# Patient Record
Sex: MALE | Race: WHITE | Employment: STUDENT | URBAN - METROPOLITAN AREA
[De-identification: names, ages, dates, MRNs, and addresses within clinical notes are randomized per-mention and may not be internally consistent; named-entity substitution may affect disease eponyms.]

---
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
## 2025-01-06 ENCOUNTER — OFFICE VISIT (OUTPATIENT)
Dept: FAMILY MEDICINE CLINIC | Facility: CLINIC | Age: 20
End: 2025-01-06
Payer: COMMERCIAL

## 2025-01-06 VITALS
HEIGHT: 67.5 IN | HEART RATE: 88 BPM | TEMPERATURE: 98 F | SYSTOLIC BLOOD PRESSURE: 106 MMHG | RESPIRATION RATE: 18 BRPM | BODY MASS INDEX: 21.41 KG/M2 | OXYGEN SATURATION: 98 % | WEIGHT: 138 LBS | DIASTOLIC BLOOD PRESSURE: 72 MMHG

## 2025-01-06 DIAGNOSIS — J02.9 SORE THROAT: Primary | ICD-10-CM

## 2025-01-06 DIAGNOSIS — J01.90 ACUTE RHINOSINUSITIS: ICD-10-CM

## 2025-01-06 DIAGNOSIS — R59.1 LYMPHADENOPATHY: ICD-10-CM

## 2025-01-06 LAB
CONTROL LINE PRESENT WITH A CLEAR BACKGROUND (YES/NO): YES YES/NO
KIT LOT #: NORMAL NUMERIC
STREP GRP A CUL-SCR: NEGATIVE

## 2025-01-06 PROCEDURE — 87635 SARS-COV-2 COVID-19 AMP PRB: CPT | Performed by: PHYSICIAN ASSISTANT

## 2025-01-06 RX ORDER — FLUTICASONE PROPIONATE 50 MCG
2 SPRAY, SUSPENSION (ML) NASAL DAILY
Qty: 16 G | Refills: 0 | Status: SHIPPED | OUTPATIENT
Start: 2025-01-06

## 2025-01-06 NOTE — PROGRESS NOTES
CHIEF COMPLAINT:     Chief Complaint   Patient presents with    Sinus Problem     2 days, congestion, nasal drainage, headache, sore throat  OTC antihistamines     HPI:   Patrick Tom is a 19 year old male who presents for sinus congestion, sore throat, body aches, ear pressure, headache for 2 days. Pt recently returned from [place]. Symptoms have been persisting since onset. Denies cough, chills,  fever. States he has baseline sinus pressure and congestion. OTC nasal sprays have not worked for him. Also c/o swollen lymph node to R side of neck, has had for a couple weeks. Was seen by PCP at onset but told to monitor. Pt has f/u appt with PCP in 3 days.   Has treated symptoms with antihistamine .       Current Outpatient Medications   Medication Sig Dispense Refill    amoxicillin clavulanate 875-125 MG Oral Tab Take 1 tablet by mouth 2 (two) times daily for 7 days. 14 tablet 0    fluticasone propionate 50 MCG/ACT Nasal Suspension 2 sprays by Each Nare route daily. 16 g 0    sertraline 50 MG Oral Tab Take 1.5 tablets (75 mg total) by mouth daily. 135 tablet 0    methylphenidate ER (CONCERTA) 36 MG Oral Tab CR Take 1 tablet (36 mg total) by mouth daily. 30 tablet 0    [START ON 1/27/2025] methylphenidate ER (CONCERTA) 36 MG Oral Tab CR Take 1 tablet (36 mg total) by mouth daily. 30 tablet 0    [START ON 2/27/2025] methylphenidate ER (CONCERTA) 36 MG Oral Tab CR Take 1 tablet (36 mg total) by mouth daily. 30 tablet 0    Clindamycin Phos-Benzoyl Perox 1.2-3.75 % External Gel Apply 1 Application topically every morning.      Cetirizine HCl (ZYRTEC) 10 MG Oral Chew Tab As Directed.      FLOVENT HFA 44 MCG/ACT Inhalation Aerosol       PROAIR  (90 Base) MCG/ACT Inhalation Aero Soln         Past Medical History:    ADHD (attention deficit hyperactivity disorder)    Asthma (HCC)    Heart murmur    followed by pediatrician,no cardiology    Seasonal allergies    Short stature (child)      Past Surgical History:    Procedure Laterality Date    Remove tonsils/adenoids,<13 y/o        No family history on file.   Social History     Socioeconomic History    Marital status: Single   Tobacco Use    Smoking status: Never    Smokeless tobacco: Never   Vaping Use    Vaping status: Never Used   Substance and Sexual Activity    Alcohol use: Yes     Alcohol/week: 3.0 standard drinks of alcohol     Types: 3 Standard drinks or equivalent per week    Drug use: Never         REVIEW OF SYSTEMS:   GENERAL: feels well otherwise,  ok appetite  HEENT: See HPI.    LUNGS: denies shortness of breath or wheezing, See HPI  CARDIOVASCULAR: denies chest pain or palpitations   NEURO: + sinus headaches.  No numbness or tingling in face.    EXAM:   /72   Pulse 88   Temp 97.6 °F (36.4 °C)   Resp 18   Ht 5' 7.5\" (1.715 m)   Wt 138 lb (62.6 kg)   SpO2 98%   BMI 21.29 kg/m²   GENERAL: well developed, well nourished, in no apparent distress  HEAD: atraumatic, normocephalic,  + tenderness on palpation of maxillary sinuses  EYES: conjunctiva clear; EOMI.  EARS: TM's clear gray, no bulging, + retraction left TM, + fluid bilaterally  NOSE: nostrils patent, + nasal mucous, nasal mucosa reddened and swollen  THROAT: oral mucosa pink, moist.  Posterior pharynx is erythematous. No exudates. Tonsils surgically absent  LUNGS: Breathing is non labored; clear to auscultation bilaterally. No wheezing, rales or rhonchi.  CARDIO: RRR without murmur  LYMPH:  + R sided ant cervical, submandibular and posterior cervical lymphadenopathy.   NEURO: No focal deficits     Recent Results (from the past 24 hours)   Strep A Assay W/Optic    Collection Time: 01/06/25  2:34 PM   Result Value Ref Range    Strep Grp A Screen negative Negative    Control Line Present with a clear background (yes/no) yes Yes/No    Kit Lot # [id number] Numeric    Kit Expiration Date 11/4/25 Date       ASSESSMENT AND PLAN:   ASSESSMENT:  Patrick Tom is a 19 year old male who presents  with    ASSESSMENT:   Encounter Diagnoses   Name Primary?    Sore throat Yes    Acute rhinosinusitis     Lymphadenopathy        PLAN: Rapid strep negative. Discussed possibility of mono. If concern for that PCP can order blood-work to r/o. Covid test sent out. Augmentin script printed to start if covid negative and if sinus/sore throat symptoms persisting with lymphadenopathy. Pt agreeable  Meds and instructions as listed below. Will start Rx Flonase. If chronic sinus issues unrelieved by consistent use of nasal spray and antihistamine will need to f/u with ENT. Risks, benefits, complications and side effects of meds discussed with patient.   OTC Tylenol/Motrin prn. Push fluids; warm salt water gargles. Yogurt/probiotic recommended.   To f/u with PCP in 3 days as scheduled. To ER for acutely worsening of s/s.  Understanding verbalized.    Meds & Refills for this Visit:  Requested Prescriptions     Signed Prescriptions Disp Refills    amoxicillin clavulanate 875-125 MG Oral Tab 14 tablet 0     Sig: Take 1 tablet by mouth 2 (two) times daily for 7 days.    fluticasone propionate 50 MCG/ACT Nasal Suspension 16 g 0     Si sprays by Each Nare route daily.           There are no Patient Instructions on file for this visit.

## 2025-01-07 LAB — SARS-COV-2 RNA RESP QL NAA+PROBE: NOT DETECTED

## 2025-01-08 ENCOUNTER — HOSPITAL ENCOUNTER (EMERGENCY)
Age: 20
Discharge: HOME | End: 2025-01-08
Attending: EMERGENCY MEDICINE
Payer: COMMERCIAL

## 2025-01-08 ENCOUNTER — APPOINTMENT (OUTPATIENT)
Dept: CT IMAGING | Age: 20
End: 2025-01-08
Attending: EMERGENCY MEDICINE
Payer: COMMERCIAL

## 2025-01-08 VITALS
BODY MASS INDEX: 20.46 KG/M2 | TEMPERATURE: 99 F | WEIGHT: 135 LBS | HEART RATE: 92 BPM | OXYGEN SATURATION: 100 % | SYSTOLIC BLOOD PRESSURE: 127 MMHG | DIASTOLIC BLOOD PRESSURE: 80 MMHG | RESPIRATION RATE: 17 BRPM | HEIGHT: 68 IN

## 2025-01-08 DIAGNOSIS — J01.90 ACUTE SINUSITIS, RECURRENCE NOT SPECIFIED, UNSPECIFIED LOCATION: ICD-10-CM

## 2025-01-08 DIAGNOSIS — B27.90 INFECTIOUS MONONUCLEOSIS WITHOUT COMPLICATION, INFECTIOUS MONONUCLEOSIS DUE TO UNSPECIFIED ORGANISM: Primary | ICD-10-CM

## 2025-01-08 DIAGNOSIS — R74.8 ELEVATED LIVER ENZYMES: ICD-10-CM

## 2025-01-08 LAB
ALBUMIN SERPL-MCNC: 4.5 G/DL (ref 3.2–4.8)
ALBUMIN/GLOB SERPL: 1.3 {RATIO} (ref 1–2)
ALP LIVER SERPL-CCNC: 168 U/L
ALT SERPL-CCNC: 157 U/L
ANION GAP SERPL CALC-SCNC: 6 MMOL/L (ref 0–18)
AST SERPL-CCNC: 138 U/L (ref ?–34)
BASOPHILS # BLD AUTO: 0.06 X10(3) UL (ref 0–0.2)
BASOPHILS NFR BLD AUTO: 0.5 %
BILIRUB SERPL-MCNC: 0.5 MG/DL (ref 0.3–1.2)
BUN BLD-MCNC: 10 MG/DL (ref 9–23)
CALCIUM BLD-MCNC: 10.1 MG/DL (ref 8.7–10.4)
CHLORIDE SERPL-SCNC: 103 MMOL/L (ref 98–112)
CO2 SERPL-SCNC: 29 MMOL/L (ref 21–32)
CREAT BLD-MCNC: 0.96 MG/DL
EGFRCR SERPLBLD CKD-EPI 2021: 117 ML/MIN/1.73M2 (ref 60–?)
EOSINOPHIL # BLD AUTO: 0.02 X10(3) UL (ref 0–0.7)
EOSINOPHIL NFR BLD AUTO: 0.2 %
ERYTHROCYTE [DISTWIDTH] IN BLOOD BY AUTOMATED COUNT: 12.1 %
GLOBULIN PLAS-MCNC: 3.6 G/DL (ref 2–3.5)
GLUCOSE BLD-MCNC: 103 MG/DL (ref 70–99)
HCT VFR BLD AUTO: 42.2 %
HETEROPH AB SER QL: POSITIVE
HGB BLD-MCNC: 14.6 G/DL
IMM GRANULOCYTES # BLD AUTO: 0.04 X10(3) UL (ref 0–1)
IMM GRANULOCYTES NFR BLD: 0.4 %
LYMPHOCYTES # BLD AUTO: 7.24 X10(3) UL (ref 1.5–5)
LYMPHOCYTES NFR BLD AUTO: 65.7 %
MCH RBC QN AUTO: 29.6 PG (ref 26–34)
MCHC RBC AUTO-ENTMCNC: 34.6 G/DL (ref 31–37)
MCV RBC AUTO: 85.6 FL
MONOCYTES # BLD AUTO: 0.75 X10(3) UL (ref 0.1–1)
MONOCYTES NFR BLD AUTO: 6.8 %
NEUTROPHILS # BLD AUTO: 2.91 X10 (3) UL (ref 1.5–7.7)
NEUTROPHILS # BLD AUTO: 2.91 X10(3) UL (ref 1.5–7.7)
NEUTROPHILS NFR BLD AUTO: 26.4 %
OSMOLALITY SERPL CALC.SUM OF ELEC: 285 MOSM/KG (ref 275–295)
PLATELET # BLD AUTO: 162 10(3)UL (ref 150–450)
POCT INFLUENZA A: NEGATIVE
POCT INFLUENZA B: NEGATIVE
POTASSIUM SERPL-SCNC: 4 MMOL/L (ref 3.5–5.1)
PROT SERPL-MCNC: 8.1 G/DL (ref 5.7–8.2)
RBC # BLD AUTO: 4.93 X10(6)UL
SARS-COV-2 RNA RESP QL NAA+PROBE: NOT DETECTED
SODIUM SERPL-SCNC: 138 MMOL/L (ref 136–145)
WBC # BLD AUTO: 11 X10(3) UL (ref 4–11)

## 2025-01-08 PROCEDURE — 85025 COMPLETE CBC W/AUTO DIFF WBC: CPT | Performed by: EMERGENCY MEDICINE

## 2025-01-08 PROCEDURE — 87430 STREP A AG IA: CPT | Performed by: EMERGENCY MEDICINE

## 2025-01-08 PROCEDURE — 96375 TX/PRO/DX INJ NEW DRUG ADDON: CPT

## 2025-01-08 PROCEDURE — 99284 EMERGENCY DEPT VISIT MOD MDM: CPT

## 2025-01-08 PROCEDURE — 86403 PARTICLE AGGLUT ANTBDY SCRN: CPT | Performed by: EMERGENCY MEDICINE

## 2025-01-08 PROCEDURE — 96374 THER/PROPH/DIAG INJ IV PUSH: CPT

## 2025-01-08 PROCEDURE — 80053 COMPREHEN METABOLIC PANEL: CPT | Performed by: EMERGENCY MEDICINE

## 2025-01-08 PROCEDURE — 87502 INFLUENZA DNA AMP PROBE: CPT | Performed by: EMERGENCY MEDICINE

## 2025-01-08 PROCEDURE — 70491 CT SOFT TISSUE NECK W/DYE: CPT | Performed by: EMERGENCY MEDICINE

## 2025-01-08 PROCEDURE — 96361 HYDRATE IV INFUSION ADD-ON: CPT

## 2025-01-08 RX ORDER — DEXAMETHASONE SODIUM PHOSPHATE 4 MG/ML
10 VIAL (ML) INJECTION ONCE
Status: COMPLETED | OUTPATIENT
Start: 2025-01-08 | End: 2025-01-08

## 2025-01-08 RX ORDER — KETOROLAC TROMETHAMINE 15 MG/ML
INJECTION, SOLUTION INTRAMUSCULAR; INTRAVENOUS
Status: COMPLETED
Start: 2025-01-08 | End: 2025-01-08

## 2025-01-08 RX ORDER — FLUTICASONE PROPIONATE 50 MCG
2 SPRAY, SUSPENSION (ML) NASAL DAILY
Qty: 16 G | Refills: 0 | Status: SHIPPED | OUTPATIENT
Start: 2025-01-08 | End: 2025-01-16

## 2025-01-08 RX ORDER — DOXYCYCLINE 100 MG/1
100 CAPSULE ORAL 2 TIMES DAILY
Qty: 14 CAPSULE | Refills: 0 | Status: SHIPPED | OUTPATIENT
Start: 2025-01-08 | End: 2025-01-15

## 2025-01-08 RX ORDER — DEXAMETHASONE SODIUM PHOSPHATE 4 MG/ML
VIAL (ML) INJECTION
Status: COMPLETED
Start: 2025-01-08 | End: 2025-01-08

## 2025-01-08 RX ORDER — KETOROLAC TROMETHAMINE 15 MG/ML
15 INJECTION, SOLUTION INTRAMUSCULAR; INTRAVENOUS ONCE
Status: COMPLETED | OUTPATIENT
Start: 2025-01-08 | End: 2025-01-08

## 2025-01-08 RX ORDER — IOPAMIDOL 755 MG/ML
50 INJECTION, SOLUTION INTRAVASCULAR
Status: COMPLETED | OUTPATIENT
Start: 2025-01-08 | End: 2025-01-08

## 2025-01-08 RX ORDER — DEXAMETHASONE SODIUM PHOSPHATE 10 MG/ML
10 INJECTION, SOLUTION INTRAMUSCULAR; INTRAVENOUS ONCE
Status: DISCONTINUED | OUTPATIENT
Start: 2025-01-08 | End: 2025-01-08

## 2025-01-09 NOTE — DISCHARGE INSTRUCTIONS
Stop taking the amoxicillin that was previously prescribed.  Follow-up tomorrow with ENT.  Avoid Tylenol and any other agents that can affect your liver as your liver enzymes are elevated.  Avoid all contact sports/activities.  Stay well-hydrated.  Return to ER if any change or worsening symptoms

## 2025-01-09 NOTE — ED INITIAL ASSESSMENT (HPI)
Patient complains of throat pain, sinus pressure, and swollen lymph nodes for 3 days. Patient does not have tonsils. Patient denies any SOB, feeling of throat closing, or dizziness. Patient was seen at  3 days PTA and diagnosed with swollen lymph nodes. Patient was negative for COVID-19 and Strep Throat at that time. Patient taking Amoxicillin.

## 2025-01-09 NOTE — ED PROVIDER NOTES
Patient Seen in: [facility] In [place]      History     Chief Complaint   Patient presents with    Throat Problem     Stated Complaint: Pt c/o sorethroat, swollen tonsils x 3 days.    Subjective:   HPI      This is a 19-year-old male who presents emergency room for evaluation of swollen lymph glands.  Patient states over the last few days has been having sinus pressure with some sore throat as well as swollen lymph nodes to the right side of his neck.  Patient denies difficulty swallowing but states his voice does sound hoarse.  Was seen at an immediate care 3 days ago for similar symptoms, had negative COVID and strep testing and was prescribed amoxicillin.  Patient denies chest pain or shortness of breath, denies cough.  Denies abdominal pain.    Objective:     Past Medical History:    ADHD (attention deficit hyperactivity disorder)    Asthma (HCC)    Heart murmur    followed by pediatrician,no cardiology    Seasonal allergies    Short stature (child)              Past Surgical History:   Procedure Laterality Date    Remove tonsils/adenoids,<11 y/o                  Social History     Socioeconomic History    Marital status: Single   Tobacco Use    Smoking status: Never    Smokeless tobacco: Never   Vaping Use    Vaping status: Never Used   Substance and Sexual Activity    Alcohol use: Yes     Alcohol/week: 3.0 standard drinks of alcohol     Types: 3 Standard drinks or equivalent per week     Comment: Socially    Drug use: Yes     Types: Cannabis                  Physical Exam     ED Triage Vitals [01/08/25 1931]   /82   Pulse 101   Resp 18   Temp 98.1 °F (36.7 °C)   Temp src Temporal   SpO2 97 %   O2 Device None (Room air)       Current Vitals:   Vital Signs  BP: 127/80  Pulse: 92  Resp: 17  Temp: 99.1 °F (37.3 °C)  Temp src: Temporal    Oxygen Therapy  SpO2: 100 %  O2 Device: None (Room air)        Physical Exam  GENERAL: Patient is awake, alert, in no acute distress.  HEENT:  no  scleral icterus.  Mucous membranes are slightly dry, mild posterior pharyngeal edema without increased, uvula midline.    NECK: Neck is supple, there is no nuchal rigidity.  No carotid bruits.  No masses.  Trachea midline.  Tenderness and swelling to the right submandibular area without erythema or fluctuance  HEART: Regular rate and rhythm, no murmurs.  LUNGS: Clear to auscultation bilaterally.  No Rales, no rhonchi, no wheezing, no stridor.  ABDOMEN: Soft, nondistended,non tender, no hepatosplenomegaly, no rebound, no rigidity, no guarding.no pulsatile masses. No CVA tenderness  EXTREMITIES: No peripheral edema  SKIN: Warm, dry, intact, no rashes.  NEUROLOGIC EXAM: Tongue midline, no facial drooping,  ED Course     Labs Reviewed   COMP METABOLIC PANEL (14) - Abnormal; Notable for the following components:       Result Value    Glucose 103 (*)      (*)      (*)     Alkaline Phosphatase 168 (*)     Globulin  3.6 (*)     All other components within normal limits   CBC WITH DIFFERENTIAL WITH PLATELET - Abnormal; Notable for the following components:    Lymphocyte Absolute 7.24 (*)     All other components within normal limits   MONO QUAL, RFX TO EBV-VCA ON NEG - Abnormal; Notable for the following components:    Monoscreen Positive (*)     All other components within normal limits   RAPID STREP A SCREEN (LC) - Normal    Narrative:     A confirmatory culture is recommended if clinically indicated.   POCT FLU TEST - Normal    Narrative:     This assay is a rapid molecular in vitro test utilizing nucleic acid amplification of influenza A and B viral RNA.   RAPID SARS-COV-2 BY PCR - Normal   SCAN SLIDE   PATH COMMENT CBC                   MDM        Differential diagnosis before testing includes but not limited to retropharyngeal abscess, lymphadenitis, tonsillitis, epiglottitis, mononucleosis, COVID, influenza, electrolyte abnormality, which is a medical condition that poses a threat to  life/function    Radiographic images  I personally reviewed the radiographs and my individual interpretation shows CT neck, no abscess noted  I also reviewed the official reports that showed CT markedly enlarged adenoid soft tissues occluding the nasal airway, there is also sinonasal mucosal thickening, considerable enlargement of the tonsillar tissue, no abscess.      Medications Provided: IV Toradol, IV Decadron, IV normal saline    Course of Events during Emergency Room Visit include IV established, blood work obtained.  CBC white count 11.0 hemoglobin 14.6 platelet 162.  Strep COVID and influenza testing negative.  Mono testing positive.  Chemistry sodium 138 potassium 4.0 BUN 10 creatinine 0.96 glucose 1 3.  AST/ALT were elevated 138/157 with an alk phos of 160.  Bilirubin 0.5.  CT of the neck performed and I discussed all results with the patient and father.  On reevaluation patient reports he is feeling better.  Patient's symptoms are consistent with mono, does have evidence of sinusitis on clinical exam, instructed to stop taking the amoxicillin and I will prescribe doxycycline.  Will also prescribe Flonase.  Patient does have follow-up with ENT tomorrow which she was instructed to keep.  Avoid all hepatotoxic agents as he does have elevated liver enzymes consistent with mono, instructed to avoid contact sports/activities.  Follow-up with primary care physician as well and return to ER for any change or worsening symptoms.  Patient and father verbalized understanding, discharged in good condition.    Shared decision making was utilized           Disposition:      Discharge  I have discussed with the patient the results of test, differential diagnosis, treatment plan, warning signs and symptoms which should prompt immediate return.  They expressed understanding of these instructions and agrees to the following plan provided.  They were given written discharge instructions and agrees to return for any concerns  and voiced understanding and all questions were answered.    Note to patient: The 21st Century Cures Act makes medical notes like these available to patients in the interest of transparency. However, this is a medical document intended as peer to peer communication. It is written in medical language and may contain abbreviations or verbiage that are unfamiliar. It may appear blunt or direct. Medical documents are intended to carry relevant information, facts as evident, and the clinical opinion of the practitioner.                 Medical Decision Making      Disposition and Plan     Clinical Impression:  1. Infectious mononucleosis without complication, infectious mononucleosis due to unspecified organism    2. Acute sinusitis, recurrence not specified, unspecified location    3. Elevated liver enzymes         Disposition:  Discharge  1/8/2025  9:21 pm    Follow-up:  Jay Capps MD  [address]  [facility] [address]  [place] [address] [phone]    Follow up in 2 day(s)      Bradley Rivers MD  [address]  [address]  [place] [id number] [phone]    Follow up in 1 day(s)            Medications Prescribed:  Current Discharge Medication List        START taking these medications    Details   doxycycline 100 MG Oral Cap Take 1 capsule (100 mg total) by mouth 2 (two) times daily for 7 days.  Qty: 14 capsule, Refills: 0      !! fluticasone propionate 50 MCG/ACT Nasal Suspension 2 sprays by Nasal route daily.  Qty: 16 g, Refills: 0       !! - Potential duplicate medications found. Please discuss with provider.              Supplementary Documentation:

## 2025-01-16 ENCOUNTER — OFFICE VISIT (OUTPATIENT)
Dept: INTERNAL MEDICINE CLINIC | Facility: CLINIC | Age: 20
End: 2025-01-16
Payer: COMMERCIAL

## 2025-01-16 VITALS
RESPIRATION RATE: 18 BRPM | WEIGHT: 133 LBS | DIASTOLIC BLOOD PRESSURE: 76 MMHG | SYSTOLIC BLOOD PRESSURE: 110 MMHG | HEART RATE: 108 BPM | HEIGHT: 68 IN | TEMPERATURE: 98 F | BODY MASS INDEX: 20.16 KG/M2 | OXYGEN SATURATION: 99 %

## 2025-01-16 DIAGNOSIS — E86.0 DEHYDRATION: ICD-10-CM

## 2025-01-16 DIAGNOSIS — B27.10 CYTOMEGALOVIRAL MONONUCLEOSIS WITHOUT COMPLICATION: Primary | ICD-10-CM

## 2025-01-16 PROCEDURE — 99214 OFFICE O/P EST MOD 30 MIN: CPT | Performed by: NURSE PRACTITIONER

## 2025-01-16 RX ORDER — PREDNISONE 20 MG/1
20 TABLET ORAL DAILY
COMMUNITY
Start: 2025-01-09 | End: 2025-01-19

## 2025-01-16 RX ORDER — PREDNISONE 20 MG/1
40 TABLET ORAL DAILY
Qty: 14 TABLET | Refills: 0 | Status: SHIPPED | OUTPATIENT
Start: 2025-01-16 | End: 2025-01-23

## 2025-01-16 RX ORDER — AZELASTINE 1 MG/ML
2 SPRAY, METERED NASAL 2 TIMES DAILY
COMMUNITY
Start: 2025-01-09

## 2025-01-16 NOTE — PROGRESS NOTES
HPI:   Diagnosed with mono last week. Sinus symptoms somewhat better. He was started on prednisone and Azestaline by ENT but nasal spray runs out of his nose d/t swelling. He is tolerating liquid intake, eating soft foods. Swelling in neck has gone down.     Current Outpatient Medications   Medication Sig Dispense Refill    predniSONE 20 MG Oral Tab Take 1 tablet (20 mg total) by mouth daily.      azelastine 0.1 % Nasal Solution 2 sprays by Nasal route 2 (two) times daily.      predniSONE 20 MG Oral Tab Take 2 tablets (40 mg total) by mouth daily for 7 days. 14 tablet 0    fluticasone propionate 50 MCG/ACT Nasal Suspension 2 sprays by Each Nare route daily. 16 g 0    sertraline 50 MG Oral Tab Take 1.5 tablets (75 mg total) by mouth daily. 135 tablet 0    methylphenidate ER (CONCERTA) 36 MG Oral Tab CR Take 1 tablet (36 mg total) by mouth daily. 30 tablet 0    [START ON 1/27/2025] methylphenidate ER (CONCERTA) 36 MG Oral Tab CR Take 1 tablet (36 mg total) by mouth daily. 30 tablet 0    [START ON 2/27/2025] methylphenidate ER (CONCERTA) 36 MG Oral Tab CR Take 1 tablet (36 mg total) by mouth daily. 30 tablet 0    Clindamycin Phos-Benzoyl Perox 1.2-3.75 % External Gel Apply 1 Application topically every morning.      Cetirizine HCl (ZYRTEC) 10 MG Oral Chew Tab As Directed.        Past Medical History:    ADHD (attention deficit hyperactivity disorder)    Asthma (HCC)    Heart murmur    followed by pediatrician,no cardiology    Seasonal allergies    Short stature (child)      Social History:  Social History     Socioeconomic History    Marital status: Single   Tobacco Use    Smoking status: Never    Smokeless tobacco: Never   Vaping Use    Vaping status: Never Used   Substance and Sexual Activity    Alcohol use: Yes     Alcohol/week: 3.0 standard drinks of alcohol     Types: 3 Standard drinks or equivalent per week     Comment: Socially    Drug use: Yes     Types: Cannabis         REVIEW OF SYSTEMS:   GENERAL HEALTH:  feels well otherwise  SKIN: denies any unusual skin lesions or rashes  RESPIRATORY: no shortness of breath with exertion  CARDIOVASCULAR: denies chest pain on exertion  GI: no nausea or vomiting  NEURO: denies headaches    EXAM:   /76   Pulse 108   Temp 98 °F (36.7 °C) (Temporal)   Resp 18   Ht 5' 8\" (1.727 m)   Wt 133 lb (60.3 kg)   SpO2 99%   BMI 20.22 kg/m²   GENERAL: well developed, well nourished,in no apparent distress  SKIN: no rashes,no suspicious lesions  ENT: bilateral tympanic membrane WNL, nasal mucosal edema, sinus tenderness, uvula midline, oropharyngeal exudate and erythema     CV: RRR no murmur  PULM: cta b/l breathing is unlabored  GI: good BS's, non tender    ASSESSMENT AND PLAN:     Encounter Diagnoses   Name Primary?    Cytomegaloviral mononucleosis without complication Yes    Dehydration         Requested Prescriptions     Signed Prescriptions Disp Refills    predniSONE 20 MG Oral Tab 14 tablet 0     Sig: Take 2 tablets (40 mg total) by mouth daily for 7 days.     No contact sports for 3 months  Recheck LFT in 3 months  Instructions given on increasing fluid intake d/t tachycardia   The patient indicates understanding of these issues and agrees to the plan.  The patient is asked to return to ER for any difficulty breathing, throat tightness, drooling etc

## 2025-01-16 NOTE — PATIENT INSTRUCTIONS
Increase fluids, popsicles, pedilyte, gatorades, water   Increase prednisone 40 mg x 7 days  No contact sports

## 2025-02-03 DIAGNOSIS — F41.9 ANXIETY: ICD-10-CM

## 2025-02-03 NOTE — TELEPHONE ENCOUNTER
To be filled at: None [Patient requested: [facility] at [facility] [address]]

## 2025-02-03 NOTE — TELEPHONE ENCOUNTER
Last time medication was refilled 12/27/2024  Last office visit  01/16/2025  Next office visit due/scheduled No future appointments.        Medication not on protocol.

## 2025-03-22 DIAGNOSIS — F90.2 ATTENTION DEFICIT HYPERACTIVITY DISORDER (ADHD), COMBINED TYPE: ICD-10-CM

## 2025-03-23 RX ORDER — METHYLPHENIDATE HYDROCHLORIDE 36 MG/1
36 TABLET ORAL DAILY
Qty: 30 TABLET | Refills: 0 | Status: SHIPPED | OUTPATIENT
Start: 2025-03-23 | End: 2025-04-22

## 2025-03-23 NOTE — TELEPHONE ENCOUNTER
Last time medication was refilled: 2/27/25  Next office visit due/scheduled: no apt  Last office visit: 1/16/25  Last Labs: 8/6/24

## 2025-05-04 DIAGNOSIS — F41.9 ANXIETY: ICD-10-CM

## 2025-05-04 NOTE — TELEPHONE ENCOUNTER
Last time medication was refilled: 2/3/25  Next office visit due/scheduled: no apt  Last office visit: 1/16/25  Last Labs: 8/6/24

## 2025-05-08 ENCOUNTER — PATIENT MESSAGE (OUTPATIENT)
Dept: INTERNAL MEDICINE CLINIC | Facility: CLINIC | Age: 20
End: 2025-05-08

## 2025-05-08 DIAGNOSIS — F90.2 ATTENTION DEFICIT HYPERACTIVITY DISORDER (ADHD), COMBINED TYPE: ICD-10-CM

## 2025-05-08 RX ORDER — METHYLPHENIDATE HYDROCHLORIDE 36 MG/1
36 TABLET ORAL DAILY
Qty: 30 TABLET | Refills: 0 | Status: SHIPPED | OUTPATIENT
Start: 2025-05-08 | End: 2025-06-07

## 2025-05-08 NOTE — TELEPHONE ENCOUNTER
Last time medication was refilled 3/23/25  Last office visit  1/16/25  Next office visit due/scheduled no appt scheduled

## 2025-05-22 ENCOUNTER — OFFICE VISIT (OUTPATIENT)
Dept: INTERNAL MEDICINE CLINIC | Facility: CLINIC | Age: 20
End: 2025-05-22
Payer: COMMERCIAL

## 2025-05-22 VITALS
BODY MASS INDEX: 21.37 KG/M2 | DIASTOLIC BLOOD PRESSURE: 60 MMHG | TEMPERATURE: 99 F | HEART RATE: 78 BPM | HEIGHT: 68 IN | WEIGHT: 141 LBS | SYSTOLIC BLOOD PRESSURE: 110 MMHG | OXYGEN SATURATION: 98 % | RESPIRATION RATE: 18 BRPM

## 2025-05-22 DIAGNOSIS — N41.0 ACUTE PROSTATITIS: ICD-10-CM

## 2025-05-22 DIAGNOSIS — N50.811 PAIN IN BOTH TESTICLES: ICD-10-CM

## 2025-05-22 DIAGNOSIS — N50.812 PAIN IN BOTH TESTICLES: ICD-10-CM

## 2025-05-22 DIAGNOSIS — Z00.00 ANNUAL PHYSICAL EXAM: Primary | ICD-10-CM

## 2025-05-22 DIAGNOSIS — Z00.00 ROUTINE GENERAL MEDICAL EXAMINATION AT A HEALTH CARE FACILITY: ICD-10-CM

## 2025-05-22 LAB
APPEARANCE: CLEAR
BILIRUBIN: NEGATIVE
GLUCOSE (URINE DIPSTICK): NEGATIVE MG/DL
KETONES (URINE DIPSTICK): NEGATIVE MG/DL
LEUKOCYTES: NEGATIVE
MULTISTIX LOT#: NORMAL NUMERIC
NITRITE, URINE: NEGATIVE
OCCULT BLOOD: NEGATIVE
PH, URINE: 6 (ref 4.5–8)
PROTEIN (URINE DIPSTICK): NEGATIVE MG/DL
SPECIFIC GRAVITY: 1.02 (ref 1–1.03)
URINE-COLOR: YELLOW
UROBILINOGEN,SEMI-QN: 0.2 MG/DL (ref 0–1.9)

## 2025-05-22 RX ORDER — LEVOFLOXACIN 500 MG/1
500 TABLET, FILM COATED ORAL DAILY
Qty: 10 TABLET | Refills: 0 | Status: SHIPPED | OUTPATIENT
Start: 2025-05-22

## 2025-05-22 NOTE — PROGRESS NOTES
Subjective:   Patient ID: Patrick Tom is a 19 year old male.    Testicular Swelling      Patrick Tom is a 19 year old male who presents for a complete physical exam.   HPI:   Pt complains of testicular pain x 1-2 weeks. Some urinary freq  No fever or discharge    Sxs were evaluated at [facility] at the health clinic. STI screen and Us scrotum were negative (reviewed in EPIC)  Given doxy for 7 days for treatment    PAST MEDICAL, SOCIAL, FAMILY HISTORIES REVIEWED WITH PT  .    Immunization History   Administered Date(s) Administered    Covid-19 Vaccine Pfizer 30 mcg/0.3 ml 05/20/2021, 06/16/2021, 12/23/2021    DTAP 08/15/2005, 10/05/2005, 12/09/2005, 12/04/2006    DTAP-IPV 06/07/2010    FLU VAC QIV SPLIT 3 YRS AND OLDER (38672) 11/11/2009    HEP B/HIB 09/28/2006    Hep B, Unspecified Formulation 03/09/2006, 12/04/2006    Hib, Unspecified Formulation 08/15/2005, 10/05/2005, 12/09/2005    Hpv Virus Vaccine 9 [facility] Im 09/30/2021, 12/01/2021, 05/12/2022    IPV 08/15/2005, 10/05/2005, 06/05/2006    MMR 12/04/2006, 06/07/2010    Meningococcal Groups A,c,y,w Conjugate Vaccine 05/12/2022    Meningococcal-Menactra 07/08/2016    Pneumococcal (Prevnar 13) 08/15/2005, 10/05/2005, 12/09/2005, 06/05/2006    Pneumococcal Conjugate PCV20 05/13/2024    TDAP 07/08/2016    Varicella 09/28/2006, 06/07/2010     Wt Readings from Last 6 Encounters:   05/22/25 141 lb (64 kg) (26%, Z= -0.64)*   01/16/25 133 lb (60.3 kg) (16%, Z= -1.01)*   01/08/25 135 lb (61.2 kg) (18%, Z= -0.90)*   01/06/25 138 lb (62.6 kg) (23%, Z= -0.74)*   12/27/24 139 lb (63 kg) (24%, Z= -0.69)*   08/05/24 145 lb (65.8 kg) (36%, Z= -0.35)*     * Growth percentiles are based on CDC (Boys, 2-20 Years) data.     Body mass index is 21.44 kg/m².     Lab Results   Component Value Date     (H) 01/08/2025    GLU 89 08/06/2024    GLU 84 09/03/2019     Lab Results   Component Value Date    CHOLEST 143 08/06/2024     Lab Results   Component Value Date    HDL  50 08/06/2024     Lab Results   Component Value Date    LDL 77 08/06/2024     Lab Results   Component Value Date     (H) 01/08/2025    AST 20 08/06/2024    AST 23 09/03/2019     Lab Results   Component Value Date     (H) 01/08/2025    ALT 13 08/06/2024    ALT 21 09/03/2019     No results found for: \"PSA\"     Current Medications[1]   Past Medical History[2]   Past Surgical History[3]   Family History[4]   Social History:  Short Social Hx on File[5]   Occ: student..   Exercise: once per week,  twice per week.  Diet: watches fats closely and watches sugar closely     REVIEW OF SYSTEMS:   GENERAL: feels well otherwise  A comprehensive 10 point review of systems was completed.     Pertinent positives and negatives noted in the HPI.      EXAM:   /60   Pulse 78   Temp 98.8 °F (37.1 °C)   Resp 18   Ht 5' 8\" (1.727 m)   Wt 141 lb (64 kg)   SpO2 98%   BMI 21.44 kg/m²   Body mass index is 21.44 kg/m².   GENERAL: well developed, well nourished,in no apparent distress  SKIN: no rashes,no suspicious lesions  HEENT: atraumatic, normocephalic,ears and throat are clear  EYES:PERRLA, EOMI, conjunctiva are clear  NECK: supple,no adenopathy,no bruits  LUNGS: clear to auscultation  CARDIO: RRR without murmur  GI: good BS's,no masses, HSM or tenderness  : two descended testes,no masses,no hernia,no penile lesions  RECTAL: deferred  MUSCULOSKELETAL: back is not tender  EXTREMITIES: no cyanosis, clubbing or edema  NEURO: Oriented times three,motor and sensory are grossly intact    ASSESSMENT AND PLAN:   Patrick Tom is a 19 year old male who presents for a complete physical exam. Body mass index is 21.44 kg/m²., recommended low fat diet and aerobic exercise 30 minutes three times weekly.     Health maintenance, will check fasting Lipids, CMP, CBC     Testicular pain- recent normal testicular US and neg STI screen. I suspect not treated with correct abx and will treat with levaquin 500mg daily x 10  days. Refer to urology for eval    Pt info handouts given for: exercise, low fat diet,     The patient indicates understanding of these issues and agrees to the plan.  The patient is asked to return for CPX in 12 m.    History/Other:   Review of Systems  Current Medications[6]  Allergies:Allergies[7]    Objective:   Physical Exam    Assessment & Plan:   1. Pain in testicle, unspecified laterality        Orders Placed This Encounter   Procedures    Urine Dip, auto without Micro       Meds This Visit:  Requested Prescriptions      No prescriptions requested or ordered in this encounter       Imaging & Referrals:  None         [1]   Current Outpatient Medications   Medication Sig Dispense Refill    methylphenidate ER (CONCERTA) 36 MG Oral Tab CR Take 1 tablet (36 mg total) by mouth daily. 30 tablet 0    SERTRALINE 50 MG Oral Tab TAKE 1 AND 1/2 TABLETS BY MOUTH DAILY 135 tablet 0    azelastine 0.1 % Nasal Solution 2 sprays by Nasal route 2 (two) times daily.      fluticasone propionate 50 MCG/ACT Nasal Suspension 2 sprays by Each Nare route daily. 16 g 0    Clindamycin Phos-Benzoyl Perox 1.2-3.75 % External Gel Apply 1 Application topically every morning.      Cetirizine HCl (ZYRTEC) 10 MG Oral Chew Tab As Directed.     [2]   Past Medical History:   ADHD (attention deficit hyperactivity disorder)    Asthma (HCC)    Heart murmur    followed by pediatrician,no cardiology    Seasonal allergies    Short stature (child)   [3]   Past Surgical History:  Procedure Laterality Date    Remove tonsils/adenoids,<11 y/o     [4] History reviewed. No pertinent family history.  [5]   Social History  Socioeconomic History    Marital status: Single   Tobacco Use    Smoking status: Never    Smokeless tobacco: Never   Vaping Use    Vaping status: Never Used   Substance and Sexual Activity    Alcohol use: Yes     Alcohol/week: 3.0 standard drinks of alcohol     Types: 3 Standard drinks or equivalent per week     Comment: Socially    Drug  use: Yes     Types: Cannabis   [6]   Current Outpatient Medications   Medication Sig Dispense Refill    methylphenidate ER (CONCERTA) 36 MG Oral Tab CR Take 1 tablet (36 mg total) by mouth daily. 30 tablet 0    SERTRALINE 50 MG Oral Tab TAKE 1 AND 1/2 TABLETS BY MOUTH DAILY 135 tablet 0    azelastine 0.1 % Nasal Solution 2 sprays by Nasal route 2 (two) times daily.      fluticasone propionate 50 MCG/ACT Nasal Suspension 2 sprays by Each Nare route daily. 16 g 0    Clindamycin Phos-Benzoyl Perox 1.2-3.75 % External Gel Apply 1 Application topically every morning.      Cetirizine HCl (ZYRTEC) 10 MG Oral Chew Tab As Directed.     [7]   Allergies  Allergen Reactions    Seasonal OTHER (SEE COMMENTS)     Runny nose and watery eyes      [facility]

## 2025-06-01 ENCOUNTER — APPOINTMENT (OUTPATIENT)
Dept: CT IMAGING | Age: 20
End: 2025-06-01
Attending: PHYSICIAN ASSISTANT
Payer: COMMERCIAL

## 2025-06-01 ENCOUNTER — HOSPITAL ENCOUNTER (EMERGENCY)
Age: 20
Discharge: HOME | End: 2025-06-01
Attending: EMERGENCY MEDICINE
Payer: COMMERCIAL

## 2025-06-01 VITALS
SYSTOLIC BLOOD PRESSURE: 112 MMHG | RESPIRATION RATE: 16 BRPM | HEIGHT: 68 IN | HEART RATE: 78 BPM | WEIGHT: 136 LBS | TEMPERATURE: 98 F | OXYGEN SATURATION: 100 % | BODY MASS INDEX: 20.61 KG/M2 | DIASTOLIC BLOOD PRESSURE: 74 MMHG

## 2025-06-01 DIAGNOSIS — B27.90 INFECTIOUS MONONUCLEOSIS WITHOUT COMPLICATION, INFECTIOUS MONONUCLEOSIS DUE TO UNSPECIFIED ORGANISM: Primary | ICD-10-CM

## 2025-06-01 LAB
ALBUMIN SERPL-MCNC: 4.7 G/DL (ref 3.2–4.8)
ALBUMIN/GLOB SERPL: 1.5 {RATIO} (ref 1–2)
ALP LIVER SERPL-CCNC: 81 U/L (ref 45–117)
ALT SERPL-CCNC: 20 U/L (ref 10–49)
ANION GAP SERPL CALC-SCNC: 7 MMOL/L (ref 0–18)
AST SERPL-CCNC: 34 U/L (ref ?–34)
BASOPHILS # BLD AUTO: 0.02 X10(3) UL (ref 0–0.2)
BASOPHILS NFR BLD AUTO: 0.5 %
BILIRUB SERPL-MCNC: 0.8 MG/DL (ref 0.3–1.2)
BUN BLD-MCNC: 14 MG/DL (ref 9–23)
CALCIUM BLD-MCNC: 9.5 MG/DL (ref 8.7–10.6)
CHLORIDE SERPL-SCNC: 99 MMOL/L (ref 98–112)
CO2 SERPL-SCNC: 29 MMOL/L (ref 21–32)
CREAT BLD-MCNC: 1.01 MG/DL (ref 0.7–1.3)
EGFRCR SERPLBLD CKD-EPI 2021: 110 ML/MIN/1.73M2 (ref 60–?)
EOSINOPHIL # BLD AUTO: 0.06 X10(3) UL (ref 0–0.7)
EOSINOPHIL NFR BLD AUTO: 1.4 %
ERYTHROCYTE [DISTWIDTH] IN BLOOD BY AUTOMATED COUNT: 12.2 %
GLOBULIN PLAS-MCNC: 3.1 G/DL (ref 2–3.5)
GLUCOSE BLD-MCNC: 89 MG/DL (ref 70–99)
HCT VFR BLD AUTO: 42.7 % (ref 39–53)
HETEROPH AB SER QL: POSITIVE
HGB BLD-MCNC: 14.8 G/DL (ref 13–17.5)
IMM GRANULOCYTES # BLD AUTO: 0.02 X10(3) UL (ref 0–1)
IMM GRANULOCYTES NFR BLD: 0.5 %
LYMPHOCYTES # BLD AUTO: 0.82 X10(3) UL (ref 1.5–5)
LYMPHOCYTES NFR BLD AUTO: 19.8 %
MCH RBC QN AUTO: 29.1 PG (ref 26–34)
MCHC RBC AUTO-ENTMCNC: 34.7 G/DL (ref 31–37)
MCV RBC AUTO: 83.9 FL (ref 80–100)
MONOCYTES # BLD AUTO: 0.46 X10(3) UL (ref 0.1–1)
MONOCYTES NFR BLD AUTO: 11.1 %
NEUTROPHILS # BLD AUTO: 2.77 X10 (3) UL (ref 1.5–7.7)
NEUTROPHILS # BLD AUTO: 2.77 X10(3) UL (ref 1.5–7.7)
NEUTROPHILS NFR BLD AUTO: 66.7 %
OSMOLALITY SERPL CALC.SUM OF ELEC: 280 MOSM/KG (ref 275–295)
PLATELET # BLD AUTO: 144 10(3)UL (ref 150–450)
POTASSIUM SERPL-SCNC: 4 MMOL/L (ref 3.5–5.1)
PROT SERPL-MCNC: 7.8 G/DL (ref 5.7–8.2)
RBC # BLD AUTO: 5.09 X10(6)UL (ref 4.3–5.7)
SODIUM SERPL-SCNC: 135 MMOL/L (ref 136–145)
WBC # BLD AUTO: 4.2 X10(3) UL (ref 4–11)

## 2025-06-01 PROCEDURE — 87798 DETECT AGENT NOS DNA AMP: CPT | Performed by: PHYSICIAN ASSISTANT

## 2025-06-01 PROCEDURE — 87430 STREP A AG IA: CPT | Performed by: PHYSICIAN ASSISTANT

## 2025-06-01 PROCEDURE — 96361 HYDRATE IV INFUSION ADD-ON: CPT

## 2025-06-01 PROCEDURE — 80053 COMPREHEN METABOLIC PANEL: CPT | Performed by: PHYSICIAN ASSISTANT

## 2025-06-01 PROCEDURE — 96375 TX/PRO/DX INJ NEW DRUG ADDON: CPT

## 2025-06-01 PROCEDURE — 99284 EMERGENCY DEPT VISIT MOD MDM: CPT

## 2025-06-01 PROCEDURE — 86403 PARTICLE AGGLUT ANTBDY SCRN: CPT | Performed by: PHYSICIAN ASSISTANT

## 2025-06-01 PROCEDURE — 70491 CT SOFT TISSUE NECK W/DYE: CPT | Performed by: EMERGENCY MEDICINE

## 2025-06-01 PROCEDURE — 85025 COMPLETE CBC W/AUTO DIFF WBC: CPT | Performed by: PHYSICIAN ASSISTANT

## 2025-06-01 PROCEDURE — 96365 THER/PROPH/DIAG IV INF INIT: CPT

## 2025-06-01 RX ORDER — PREDNISONE 20 MG/1
40 TABLET ORAL DAILY
Qty: 4 TABLET | Refills: 0 | Status: SHIPPED | OUTPATIENT
Start: 2025-06-01 | End: 2025-06-03

## 2025-06-01 RX ORDER — DEXAMETHASONE SODIUM PHOSPHATE 10 MG/ML
10 INJECTION, SOLUTION INTRAMUSCULAR; INTRAVENOUS ONCE
Status: COMPLETED | OUTPATIENT
Start: 2025-06-01 | End: 2025-06-01

## 2025-06-01 NOTE — DISCHARGE INSTRUCTIONS
Continue steroids these next 2 days.  Avoid contact sports for the next 4 to 6 weeks.  Further titers are pending.

## 2025-06-01 NOTE — ED PROVIDER NOTES
Patient Seen in: [facility] In [place]        History  Chief Complaint   Patient presents with    Swollen Glands    Fever     Stated Complaint: swollen R neck, mono in January, fever    Subjective:   HPI            19-year-old male.  Medical history of ADHD, asthma, functional murmur  Patient explains that this past January he was diagnosed with mono.  He has had a persistent palpable fullness to his posterior cervical region since that diagnosis.  Occasionally painful.  These last 5 days this fullness has worsened.  He has also developed malaise, myalgia and intermittent fevers He's last 72 hours.  No stridor, trismus or drooling.  No concurrent cough or cold symptoms.          Objective:     Past Medical History:    ADHD (attention deficit hyperactivity disorder)    Asthma (HCC)    Heart murmur    followed by pediatrician,no cardiology    Seasonal allergies    Short stature (child)              Past Surgical History:   Procedure Laterality Date    Remove tonsils/adenoids,<11 y/o                  Social History     Socioeconomic History    Marital status: Single   Tobacco Use    Smoking status: Never    Smokeless tobacco: Never   Vaping Use    Vaping status: Never Used   Substance and Sexual Activity    Alcohol use: Yes     Alcohol/week: 3.0 standard drinks of alcohol     Types: 3 Standard drinks or equivalent per week     Comment: Socially    Drug use: Yes     Types: Cannabis                                Physical Exam    ED Triage Vitals [06/01/25 0955]   /57   Pulse 82   Resp 16   Temp 97.7 °F (36.5 °C)   Temp src Temporal   SpO2 100 %   O2 Device None (Room air)       Current Vitals:   Vital Signs  BP: 117/57  Pulse: 82  Resp: 16  Temp: 97.7 °F (36.5 °C)  Temp src: Temporal    Oxygen Therapy  SpO2: 100 %  O2 Device: None (Room air)            Physical Exam       Gen: Well appearing, well groomed, alert and aware x 3  Neck: Right submandibular, anterior cervical and deep cervical  lymph nodes are palpable   Eye examination: EOMs are intact, normal conjunctival  ENT:  subtle fullness to the right peritonsillar region without deviation.  Possible subtle muffled voice.  No drooling.  No stridor.  Lung: No distress, RR, no retraction, breath sounds are clear bilaterally  Cardio: Regular rate and rhythm, normal S1-S2, no murmur appreciable  Skin: No sign of trauma, Skin warm and dry, no induration or sign of infection.  No rash noted  Abdominal: No significant organomegaly    ED Course  Labs Reviewed   MONO QUAL, RFX TO EBV-VCA ON NEG - Abnormal; Notable for the following components:       Result Value    Monoscreen Positive (*)     All other components within normal limits   CBC WITH DIFFERENTIAL WITH PLATELET - Abnormal; Notable for the following components:    .0 (*)     Lymphocyte Absolute 0.82 (*)     All other components within normal limits   COMP METABOLIC PANEL (14) - Abnormal; Notable for the following components:    Sodium 135 (*)     AST 34 (*)     All other components within normal limits   RAPID STREP A SCREEN (LC) - Normal    Narrative:     A confirmatory culture is recommended if clinically indicated.   SCAN SLIDE   MALIK-CHEN VIRUS PCR                            MDM     Voice subtly muffled.  Right posterior auricular, posterior cervical and anterior cervical lymph nodes are enlarged and palpable.  Internally, patient has some fullness to the right tonsillar region but no deviation.  No trismus or drooling.    IV placed.  CBC, CMP, repeat mono.  Rapid strep.  CT soft tissue neck.  IV Decadron.    Patient CBCdemonstrates no leukocytosis.  Stable hemoglobin.  Absolute lymphocytes 0.82  Platelets 144    Sodium 135, AST of 34    Rapid strep negative    Monospot remains positive     Patrick Tom #[id number] (Acct:[id number]) (:2005 19 year old M) PCP: STEVEN PICKARD ([phone])  06  Imaging Results        CT SOFT TISSUE OF NECK(CONTRAST ONLY) (CPT=70491) (Final  result)  Result time 06/01/25 11:30:26  Final result by Abdulkadir Presley MD (06/01/25 11:30:26)                Impression:    CONCLUSION:       1. Cervical adenopathy is mildly improved compared to 01/08/25 consistent with post viral reactive adenopathy.     2. Nasopharyngeal soft tissues hypertrophy including the adenoids are slightly improved is consistent with post viral reaction.              Exam and findings highly suggestive of recurrent versus chronic mono.  We discussed.  Will continue steroids these next few days.  Supportive measures.  Patient is scheduled to have his adenoids removed.      Continue steroids these next 2 days.  Avoid contact sports for the next 4 to 6 weeks.  Further titers are pending.  Medical Decision Making      Disposition and Plan     Clinical Impression:  1. Infectious mononucleosis without complication, infectious mononucleosis due to unspecified organism         Disposition:  Discharge  6/1/2025 11:57 am    Follow-up:  No follow-up provider specified.        Medications Prescribed:  Current Discharge Medication List        START taking these medications    Details   predniSONE 20 MG Oral Tab Take 2 tablets (40 mg total) by mouth daily for 2 days.  Qty: 4 tablet, Refills: 0                   Supplementary Documentation:

## 2025-06-01 NOTE — ED INITIAL ASSESSMENT (HPI)
Swollen right neck gland for 4 days, fevers on and off for 3 days, denies sore throat/cough, had mono in January

## 2025-06-07 ENCOUNTER — PATIENT MESSAGE (OUTPATIENT)
Dept: INTERNAL MEDICINE CLINIC | Facility: CLINIC | Age: 20
End: 2025-06-07

## 2025-06-07 LAB — EBV PCR REAL TIME: POSITIVE

## 2025-06-09 ENCOUNTER — OFFICE VISIT (OUTPATIENT)
Dept: FAMILY MEDICINE CLINIC | Facility: CLINIC | Age: 20
End: 2025-06-09
Payer: COMMERCIAL

## 2025-06-09 VITALS
RESPIRATION RATE: 18 BRPM | HEART RATE: 88 BPM | HEIGHT: 68 IN | DIASTOLIC BLOOD PRESSURE: 70 MMHG | WEIGHT: 140.19 LBS | OXYGEN SATURATION: 96 % | BODY MASS INDEX: 21.25 KG/M2 | SYSTOLIC BLOOD PRESSURE: 112 MMHG | TEMPERATURE: 98 F

## 2025-06-09 DIAGNOSIS — Z11.3 SCREENING FOR STD (SEXUALLY TRANSMITTED DISEASE): ICD-10-CM

## 2025-06-09 DIAGNOSIS — Z01.89 PATIENT REQUESTED DIAGNOSTIC TESTING: Primary | ICD-10-CM

## 2025-06-09 PROCEDURE — 87491 CHLMYD TRACH DNA AMP PROBE: CPT | Performed by: NURSE PRACTITIONER

## 2025-06-09 PROCEDURE — 87591 N.GONORRHOEAE DNA AMP PROB: CPT | Performed by: NURSE PRACTITIONER

## 2025-06-09 PROCEDURE — 99212 OFFICE O/P EST SF 10 MIN: CPT | Performed by: NURSE PRACTITIONER

## 2025-06-09 NOTE — PROGRESS NOTES
CHIEF COMPLAINT:     Chief Complaint   Patient presents with    Other     STD test  ( previous partner tested positive)   No symptoms        HPI:   Patrick Tom is a 20 year old male who presents for screening for STD.  Reports a previous partner who he was last with one month ago told him that she has Chlamydia.  Pt. Denies any urinary or penile complaints. Reports no discharge.  Reports he did have prostatitis a few weeks ago, but those symptoms have improved.  Denies any new partners in past month.  Pt. Denies any concerns besides STD screening.   Current Medications[1]   Past Medical History[2]   Social History:  Short Social Hx on File[3]      REVIEW OF SYSTEMS:   GENERAL: Denies fever, chills, or body aches  SKIN: no rashes, no skin wounds or ulcers.  EYES:denies blurred vision or double vision  HEENT: no congestion, rhinorrhea, sore throat or ear pain  CARDIOVASCULAR: denies chest pain or palpitations  LUNGS: denies shortness of breath, cough, or wheezing  GI: See HPI. No N/V/C/D.   : See HPI.    EXAM:   /70   Pulse 88   Temp 97.8 °F (36.6 °C) (Temporal)   Resp 18   Ht 5' 8\" (1.727 m)   Wt 140 lb 3.2 oz (63.6 kg)   SpO2 96%   BMI 21.32 kg/m²   GENERAL: well developed, well nourished,in no apparent distress      No results found for this or any previous visit (from the past 24 hours).      ASSESSMENT AND PLAN:   Patrick Tom is a 20 year old male presents with UTI symptoms.    ASSESSMENT:  Encounter Diagnosis   Name Primary?    Patient requested diagnostic testing Yes       PLAN:GC screening sent.  Asymptomatic, will treat is positive. Patient/Parent has given us consent to send out a culture and understand that they will be contacted in 1-3 days with culture results. If any severe back pain, inability to urinate, fever >101 or persistent vomiting seek emergent care.         Meds & Refills for this Visit:  Requested Prescriptions      No prescriptions requested or ordered in  this encounter       Risk and benefits of medication discussed. Stressed importance of completing full course of antibiotic.     There are no Patient Instructions on file for this visit.      The patient indicates understanding of these issues and agrees to the plan.  The patient is asked to return in 3 days if not better. Call if fever, vomiting, worsening symptoms.  Go to ED if back/flank pain with fever and vomiting.         [1]   Current Outpatient Medications   Medication Sig Dispense Refill    levoFLOXacin 500 MG Oral Tab Take 1 tablet (500 mg total) by mouth daily. (Patient not taking: Reported on 6/1/2025) 10 tablet 0    SERTRALINE 50 MG Oral Tab TAKE 1 AND 1/2 TABLETS BY MOUTH DAILY 135 tablet 0    azelastine 0.1 % Nasal Solution 2 sprays by Nasal route in the morning and 2 sprays before bedtime.      fluticasone propionate 50 MCG/ACT Nasal Suspension 2 sprays by Each Nare route daily. (Patient not taking: Reported on 6/1/2025) 16 g 0    Clindamycin Phos-Benzoyl Perox 1.2-3.75 % External Gel Apply 1 Application topically every morning. (Patient not taking: Reported on 6/1/2025)      Cetirizine HCl (ZYRTEC) 10 MG Oral Chew Tab As Directed.     [2]   Past Medical History:   ADHD (attention deficit hyperactivity disorder)    Asthma (HCC)    Heart murmur    followed by pediatrician,no cardiology    Seasonal allergies    Short stature (child)   [3]   Social History  Socioeconomic History    Marital status: Single   Tobacco Use    Smoking status: Never    Smokeless tobacco: Never   Vaping Use    Vaping status: Never Used   Substance and Sexual Activity    Alcohol use: Yes     Alcohol/week: 3.0 standard drinks of alcohol     Types: 3 Standard drinks or equivalent per week     Comment: Socially    Drug use: Yes     Types: Cannabis

## 2025-06-10 LAB
C TRACH DNA SPEC QL NAA+PROBE: NEGATIVE
N GONORRHOEA DNA SPEC QL NAA+PROBE: NEGATIVE

## 2025-06-17 ENCOUNTER — OFFICE VISIT (OUTPATIENT)
Dept: INTERNAL MEDICINE CLINIC | Facility: CLINIC | Age: 20
End: 2025-06-17
Payer: COMMERCIAL

## 2025-06-17 ENCOUNTER — LAB ENCOUNTER (OUTPATIENT)
Dept: LAB | Age: 20
End: 2025-06-17
Payer: COMMERCIAL

## 2025-06-17 VITALS
HEART RATE: 78 BPM | OXYGEN SATURATION: 98 % | RESPIRATION RATE: 18 BRPM | BODY MASS INDEX: 20.92 KG/M2 | HEIGHT: 68 IN | SYSTOLIC BLOOD PRESSURE: 118 MMHG | DIASTOLIC BLOOD PRESSURE: 60 MMHG | TEMPERATURE: 99 F | WEIGHT: 138 LBS

## 2025-06-17 DIAGNOSIS — B27.90 INFECTIOUS MONONUCLEOSIS WITHOUT COMPLICATION, INFECTIOUS MONONUCLEOSIS DUE TO UNSPECIFIED ORGANISM: Primary | ICD-10-CM

## 2025-06-17 DIAGNOSIS — B27.90 INFECTIOUS MONONUCLEOSIS WITHOUT COMPLICATION, INFECTIOUS MONONUCLEOSIS DUE TO UNSPECIFIED ORGANISM: ICD-10-CM

## 2025-06-17 PROCEDURE — 86664 EPSTEIN-BARR NUCLEAR ANTIGEN: CPT

## 2025-06-17 PROCEDURE — 86665 EPSTEIN-BARR CAPSID VCA: CPT

## 2025-06-17 PROCEDURE — 36415 COLL VENOUS BLD VENIPUNCTURE: CPT

## 2025-06-17 PROCEDURE — 99213 OFFICE O/P EST LOW 20 MIN: CPT

## 2025-06-17 NOTE — PROGRESS NOTES
Patrick Tom is a 20 year old male.  HPI:   HPI   Pt presents today for Mono f/u. Initially diagnosed on 1/8/2025. Pt has had persistent palpable fullness to his posterior cervical region since that diagnosis. Occasionally painful. These last 5 days this fullness has worsened. He has also developed malaise, myalgia and intermittent fevers that lasted 72 hours. Presented to ED on 6/1/2025 with above complaints. Mono test came back positive. CT soft tissue of neck- Cervical adenopathy is mildly improved compared to 01/08/25 consistent with post viral reactive adenopathy. Nasopharyngeal soft tissues hypertrophy including the adenoids are slightly improved is consistent with post viral reaction.   Pt is scheduled to have his adenoids removed.  Recent liver enzymes are back to normal range.  Pt states today all his symptoms resolved.   Current Medications[1]   Past Medical History[2]   Social History:  Short Social Hx on File[3]     REVIEW OF SYSTEMS:   Review of Systems   Constitutional: Negative.    HENT: Negative.     Respiratory: Negative.     Cardiovascular: Negative.    Musculoskeletal: Negative.    Skin: Negative.    Neurological: Negative.    Psychiatric/Behavioral: Negative.           EXAM:   /60   Pulse 78   Temp 98.6 °F (37 °C)   Resp 18   Ht 5' 8\" (1.727 m)   Wt 138 lb (62.6 kg)   SpO2 98%   BMI 20.98 kg/m²   Physical Exam  Vitals and nursing note reviewed.   Constitutional:       Appearance: Normal appearance. He is normal weight.   HENT:      Right Ear: Tympanic membrane, ear canal and external ear normal.      Left Ear: Tympanic membrane, ear canal and external ear normal.      Nose: Nose normal.      Mouth/Throat:      Mouth: Mucous membranes are moist.      Pharynx: Oropharynx is clear.   Cardiovascular:      Rate and Rhythm: Normal rate and regular rhythm.      Pulses: Normal pulses.      Heart sounds: Normal heart sounds.   Pulmonary:      Effort: Pulmonary effort is normal.       Breath sounds: Normal breath sounds.   Musculoskeletal:      Cervical back: Full passive range of motion without pain and normal range of motion.   Lymphadenopathy:      Cervical: No cervical adenopathy.      Right cervical: No superficial, deep or posterior cervical adenopathy.     Left cervical: No superficial, deep or posterior cervical adenopathy.   Skin:     General: Skin is warm and dry.      Capillary Refill: Capillary refill takes less than 2 seconds.   Neurological:      General: No focal deficit present.      Mental Status: He is alert and oriented to person, place, and time. Mental status is at baseline.   Psychiatric:         Mood and Affect: Mood normal.         Behavior: Behavior normal.         Thought Content: Thought content normal.          ASSESSMENT AND PLAN:   Diagnoses and all orders for this visit:    Infectious mononucleosis without complication, infectious mononucleosis due to unspecified organism  Spoke to lab regarding lab results completed in January and June. There was no reflex to IgG and IgM due to lab being sent to [facility]. Was informed to order EBV, chronic/active to receive the IgG and IgM results.  -  check   EBV, Chronic/Active Infection [E]; Future    Requested Prescriptions      No prescriptions requested or ordered in this encounter         The patient indicates understanding of these issues and agrees to the plan.           [1]   Current Outpatient Medications   Medication Sig Dispense Refill    SERTRALINE 50 MG Oral Tab TAKE 1 AND 1/2 TABLETS BY MOUTH DAILY 135 tablet 0    azelastine 0.1 % Nasal Solution 2 sprays by Nasal route in the morning and 2 sprays before bedtime.      Cetirizine HCl (ZYRTEC) 10 MG Oral Chew Tab As Directed.     [2]   Past Medical History:   ADHD (attention deficit hyperactivity disorder)    Asthma (HCC)    Heart murmur    followed by pediatrician,no cardiology    Seasonal allergies    Short stature (child)   [3]   Social History  Socioeconomic History     Marital status: Single   Tobacco Use    Smoking status: Never    Smokeless tobacco: Never   Vaping Use    Vaping status: Never Used   Substance and Sexual Activity    Alcohol use: Yes     Alcohol/week: 3.0 standard drinks of alcohol     Types: 3 Standard drinks or equivalent per week     Comment: Socially    Drug use: Yes     Types: Cannabis

## 2025-06-17 NOTE — PROGRESS NOTES
The following individual(s) verbally consented to be recorded using ambient AI listening technology and understand that they can each withdraw their consent to this listening technology at any point by asking the clinician to turn off or pause the recording:    Patient name: Patrick Tom  Additional names:  none

## 2025-06-18 LAB
EBV NA IGG SER QL IA: NEGATIVE
EBV VCA IGG SER QL IA: POSITIVE
EBV VCA IGM SER QL IA: NEGATIVE

## 2025-07-25 ENCOUNTER — LAB REQUISITION (OUTPATIENT)
Dept: LAB | Facility: HOSPITAL | Age: 20
End: 2025-07-25
Payer: COMMERCIAL

## 2025-07-25 DIAGNOSIS — J35.2 HYPERTROPHY OF ADENOIDS: ICD-10-CM

## 2025-07-25 PROCEDURE — 88304 TISSUE EXAM BY PATHOLOGIST: CPT | Performed by: OTOLARYNGOLOGY

## 2025-07-30 DIAGNOSIS — F41.9 ANXIETY: ICD-10-CM

## 2025-07-31 DIAGNOSIS — F41.9 ANXIETY: ICD-10-CM

## 2025-08-18 ENCOUNTER — PATIENT MESSAGE (OUTPATIENT)
Dept: INTERNAL MEDICINE CLINIC | Facility: CLINIC | Age: 20
End: 2025-08-18

## 2025-08-18 DIAGNOSIS — F90.2 ATTENTION DEFICIT HYPERACTIVITY DISORDER (ADHD), COMBINED TYPE: ICD-10-CM

## 2025-08-18 RX ORDER — METHYLPHENIDATE HYDROCHLORIDE 36 MG/1
36 TABLET ORAL DAILY
Qty: 30 TABLET | Refills: 0 | Status: SHIPPED | OUTPATIENT
Start: 2025-08-18 | End: 2025-09-17

## (undated) NOTE — ED AVS SNAPSHOT
Carolina Gerardo   MRN: [id number]    Department:  [facility] Emergency Department   Date of Visit:  9/24/2017           Disclosure     Insurance plans vary and the physician(s) referred by the ER may not be covered by your plan.  Please contact you If you have been prescribed any medication(s), please fill your prescription right away and begin taking the medication(s) as directed    If the emergency physician has read X-rays, these will be re-interpreted by a radiologist.  If there is a significant

## (undated) NOTE — LETTER
ASTHMA ACTION PLAN for Patrick Tom     : 2005     Date: 2024  Provider:  POWER Conte  Phone for doctor or clinic: [facility], [address] [place] [address] [id number] [phone]    ACT Score: 24      You can use the colors of a traffic light to help learn about your asthma medicines.      1. Green - Go! % of Personal Best Peak Flow Use controller medicine.   Breathing is good  No cough or wheeze  Can work and play Medicine How much to take When to take it    Cetirizine HCl (ZYRTEC) 10 MG Oral Chew Tab   As Directed         2. Yellow - Caution. 50-79% Personal Best Peak  Flow.  Use reliever medicine to keep an asthma attack from getting bad.   Cough  Wheezing  Tight Chest  Wake up at night Medicine How much to take When to take it    PROAIR  (90 Base) MCG/ACT Inhalation Aero Soln   As directed  FLOVENT HFA 44 MCG/ACT Inhalation Aerosol   As directed       Additional instructions         3. Red - Stop! Danger!  <50% Personal Best Peak  Flow. Take these medications until  Get help from a doctor   Medicine not helping  Breathing is hard and fast  Nose opens wide  Can't walk  Ribs show  Can't talk well Medicine How much to take When to take it    PROAIR  (90 Base) MCG/ACT Inhalation Aero Soln   FLOVENT HFA 44 MCG/ACT Inhalation Aerosol   Take 2 puffs now  Call 911  Go to the nearest emergency room     Additional Instructions If your symptoms do not improve and you cannot contact your doctor, [facility] or call 911 immediately!     [x] Asthma Action Plan reviewed with patient (and caregiver if necessary) and a copy of the plan was given to the patient/caregiver.   [] Asthma Action Plan reviewed with patient (and caregiver if necessary) on the phone and mailed copy to patient or submitted via [employer].     Signatures:  Provider  POWER Conte   Patient Caretaker

## (undated) NOTE — LETTER
September 15, 2022   Zenia Robb MD  [address] [place] [address]    Patient: Dani Jenkins   MR Number: [id number]   YOB: 2005   Date of Visit: 9/15/2022        Dear Hira Alvarez:    Your patient, Fabian Lopez, was recently seen and treated in our department. Attached to this letter is a summary of that visit. If you have any questions or concerns, please don't hesitate to call.     Sincerely,        POWER Polkosure

## (undated) NOTE — ED AVS SNAPSHOT
Oswaldo Coyle   MRN: [id number]    Department:  [address] [facility] in [place]   Date of Visit:  8/6/2019           Disclosure     Insurance plans vary and the physician(s) referred by the ER may not be covered by your plan.  Please contact tell this physician (or your personal doctor if your instructions are to return to your personal doctor) about any new or lasting problems. The primary care or specialist physician will see patients referred from the [facility] Emergency Department.  Yo Card